# Patient Record
Sex: FEMALE | Race: WHITE | NOT HISPANIC OR LATINO | Employment: FULL TIME | URBAN - METROPOLITAN AREA
[De-identification: names, ages, dates, MRNs, and addresses within clinical notes are randomized per-mention and may not be internally consistent; named-entity substitution may affect disease eponyms.]

---

## 2017-11-04 ENCOUNTER — OFFICE VISIT (OUTPATIENT)
Dept: URGENT CARE | Facility: CLINIC | Age: 47
End: 2017-11-04
Payer: MEDICAID

## 2017-11-04 VITALS
HEART RATE: 92 BPM | HEIGHT: 62 IN | DIASTOLIC BLOOD PRESSURE: 97 MMHG | WEIGHT: 293 LBS | TEMPERATURE: 98 F | BODY MASS INDEX: 53.92 KG/M2 | SYSTOLIC BLOOD PRESSURE: 154 MMHG | RESPIRATION RATE: 18 BRPM

## 2017-11-04 DIAGNOSIS — J01.00 ACUTE NON-RECURRENT MAXILLARY SINUSITIS: Primary | ICD-10-CM

## 2017-11-04 DIAGNOSIS — J45.21 ASTHMA IN ADULT, MILD INTERMITTENT, WITH ACUTE EXACERBATION: ICD-10-CM

## 2017-11-04 DIAGNOSIS — J40 BRONCHITIS: ICD-10-CM

## 2017-11-04 PROCEDURE — 99204 OFFICE O/P NEW MOD 45 MIN: CPT | Mod: S$GLB,,, | Performed by: SURGERY

## 2017-11-04 RX ORDER — METFORMIN HYDROCHLORIDE 500 MG/1
1000 TABLET ORAL 2 TIMES DAILY WITH MEALS
Refills: 3 | COMMUNITY
Start: 2017-10-10

## 2017-11-04 RX ORDER — AZITHROMYCIN 500 MG/1
500 TABLET, FILM COATED ORAL DAILY
Qty: 5 TABLET | Refills: 0 | Status: SHIPPED | OUTPATIENT
Start: 2017-11-04 | End: 2017-11-09

## 2017-11-04 RX ORDER — FLUTICASONE PROPIONATE 50 MCG
2 SPRAY, SUSPENSION (ML) NASAL DAILY
Qty: 1 BOTTLE | Refills: 2 | Status: SHIPPED | OUTPATIENT
Start: 2017-11-04 | End: 2017-12-04

## 2017-11-04 RX ORDER — ALOGLIPTIN 25 MG/1
TABLET, FILM COATED ORAL
Refills: 3 | COMMUNITY
Start: 2017-10-10 | End: 2020-07-06

## 2017-11-04 RX ORDER — FLUOXETINE HYDROCHLORIDE 20 MG/1
CAPSULE ORAL
Refills: 3 | COMMUNITY
Start: 2017-10-05

## 2017-11-04 RX ORDER — ALBUTEROL SULFATE 0.83 MG/ML
2.5 SOLUTION RESPIRATORY (INHALATION) EVERY 6 HOURS PRN
Qty: 1 BOX | Refills: 4 | Status: SHIPPED | OUTPATIENT
Start: 2017-11-04 | End: 2020-07-06

## 2017-11-04 RX ORDER — ALBUTEROL SULFATE 0.83 MG/ML
SOLUTION RESPIRATORY (INHALATION)
Refills: 6 | COMMUNITY
Start: 2017-10-24 | End: 2017-11-04

## 2017-11-04 RX ORDER — PRAVASTATIN SODIUM 40 MG/1
TABLET ORAL
Refills: 3 | COMMUNITY
Start: 2017-10-10 | End: 2020-07-06

## 2017-11-04 RX ORDER — BENZONATATE 200 MG/1
200 CAPSULE ORAL 3 TIMES DAILY PRN
Qty: 21 CAPSULE | Refills: 0 | Status: SHIPPED | OUTPATIENT
Start: 2017-11-04 | End: 2017-11-11

## 2017-11-04 RX ORDER — METHYLPREDNISOLONE 4 MG/1
TABLET ORAL
Qty: 1 PACKAGE | Refills: 0 | Status: SHIPPED | OUTPATIENT
Start: 2017-11-05 | End: 2020-07-06

## 2017-11-04 RX ORDER — DEXAMETHASONE SODIUM PHOSPHATE 100 MG/10ML
10 INJECTION INTRAMUSCULAR; INTRAVENOUS ONCE
Status: COMPLETED | OUTPATIENT
Start: 2017-11-04 | End: 2017-11-04

## 2017-11-04 RX ORDER — AMLODIPINE AND BENAZEPRIL HYDROCHLORIDE 10; 20 MG/1; MG/1
CAPSULE ORAL
Refills: 3 | COMMUNITY
Start: 2017-10-10 | End: 2020-07-06

## 2017-11-04 RX ADMIN — DEXAMETHASONE SODIUM PHOSPHATE 10 MG: 100 INJECTION INTRAMUSCULAR; INTRAVENOUS at 11:11

## 2017-11-04 NOTE — PROGRESS NOTES
"Subjective:       Patient ID: Lita Patel is a 47 y.o. female.    Vitals:  height is 5' 2" (1.575 m) and weight is 154.2 kg (340 lb) (abnormal). Her temperature is 98.3 °F (36.8 °C). Her blood pressure is 154/97 (abnormal) and her pulse is 92. Her respiration is 18.     Chief Complaint: Cough    Cough   This is a recurrent problem. The current episode started in the past 7 days. The problem has been gradually worsening. The problem occurs every few minutes. The cough is productive of purulent sputum. Associated symptoms include chest pain, ear congestion, headaches, nasal congestion, postnasal drip, shortness of breath, sweats and wheezing. Pertinent negatives include no chills, eye redness, fever, myalgias, rash or sore throat. The symptoms are aggravated by dust, fumes and pollens. Risk factors for lung disease include smoking/tobacco exposure. She has tried ipratropium inhaler for the symptoms.     Review of Systems   Constitution: Positive for malaise/fatigue. Negative for chills and fever.   HENT: Positive for congestion and postnasal drip. Negative for hoarse voice and sore throat.    Eyes: Negative for blurred vision, discharge and redness.   Cardiovascular: Positive for chest pain. Negative for dyspnea on exertion and leg swelling.        Pleuritic pain  coughing   Respiratory: Positive for cough, shortness of breath, sputum production and wheezing.    Skin: Negative for rash.   Musculoskeletal: Negative for back pain, joint pain and myalgias.   Gastrointestinal: Negative for abdominal pain, diarrhea, nausea and vomiting.   Neurological: Positive for headaches.   Psychiatric/Behavioral: The patient is not nervous/anxious.        Objective:      Physical Exam   Constitutional: She is oriented to person, place, and time. She appears well-developed and well-nourished. She is cooperative.  Non-toxic appearance. She does not appear ill. No distress.   HENT:   Head: Normocephalic and atraumatic.   Right Ear: " Hearing, tympanic membrane, external ear and ear canal normal.   Left Ear: Hearing, tympanic membrane, external ear and ear canal normal.   Nose: Mucosal edema and rhinorrhea present. No nasal deformity. No epistaxis. Right sinus exhibits maxillary sinus tenderness. Right sinus exhibits no frontal sinus tenderness. Left sinus exhibits maxillary sinus tenderness. Left sinus exhibits no frontal sinus tenderness.   Mouth/Throat: Uvula is midline, oropharynx is clear and moist and mucous membranes are normal. No trismus in the jaw. Normal dentition. No uvula swelling. No posterior oropharyngeal erythema.   Eyes: Conjunctivae and lids are normal. No scleral icterus.   Sclera clear bilat   Neck: Trachea normal, full passive range of motion without pain and phonation normal. Neck supple.   Cardiovascular: Normal rate, regular rhythm, normal heart sounds, intact distal pulses and normal pulses.    Pulmonary/Chest: Effort normal. No respiratory distress. She has wheezes in the right middle field, the right lower field, the left middle field and the left lower field.   Frequent productive cough   Abdominal: Soft. Normal appearance and bowel sounds are normal. She exhibits no distension. There is no tenderness.   Musculoskeletal: Normal range of motion. She exhibits no edema or deformity.   Neurological: She is alert and oriented to person, place, and time. She exhibits normal muscle tone. Coordination normal.   Skin: Skin is warm, dry and intact. She is not diaphoretic. No pallor.   Psychiatric: She has a normal mood and affect. Her speech is normal and behavior is normal. Judgment and thought content normal. Cognition and memory are normal.   Nursing note and vitals reviewed.      Assessment:       1. Acute non-recurrent maxillary sinusitis        Plan:         Acute non-recurrent maxillary sinusitis    Other orders  -     benzonatate (TESSALON) 200 MG capsule; Take 1 capsule (200 mg total) by mouth 3 (three) times daily as  needed for Cough.  Dispense: 21 capsule; Refill: 0  -     dexamethasone injection 10 mg; Inject 1 mL (10 mg total) into the muscle once.  -     methylPREDNISolone (MEDROL DOSEPACK) 4 mg tablet; use as directed  Dispense: 1 Package; Refill: 0  -     albuterol (PROVENTIL) 2.5 mg /3 mL (0.083 %) nebulizer solution; Take 3 mLs (2.5 mg total) by nebulization every 6 (six) hours as needed for Wheezing. Rescue  Dispense: 1 Box; Refill: 4  -     fluticasone (FLONASE) 50 mcg/actuation nasal spray; 2 sprays by Each Nare route once daily.  Dispense: 1 Bottle; Refill: 2  -     azithromycin (ZITHROMAX) 500 MG tablet; Take 1 tablet (500 mg total) by mouth once daily.  Dispense: 5 tablet; Refill: 0        Monitor glucose and decrease or stop medrol dose if over 250. Call for any questions.

## 2017-11-04 NOTE — LETTER
November 4, 2017      [facility] [place]  [address], [address]  [place] [address]  Phone: [phone]  Fax: [phone]       Patient: Lita Patel   YOB: 1970  Date of Visit: 11/04/2017    To Whom It May Concern:    Rachel Patel  was at [facility] on 11/04/2017. She may return to work/school on 11/07/2017with no restrictions. If you have any questions or concerns, or if I can be of further assistance, please do not hesitate to contact me.    Sincerely,    Sandi Gil MA

## 2017-11-04 NOTE — PATIENT INSTRUCTIONS
What Is Acute Bronchitis?  Acute bronchitis is when the airways in your lungs (bronchial tubes) become red and swollen (inflamed). It is usually caused by a viral infection. But it can also occur because of a bacteria or allergen. Symptoms include a cough that produces yellow or greenish mucus and can last for days or sometimes weeks.  Inside healthy lungs    Air travels in and out of the lungs through the airways. The linings of these airways produce sticky mucus. This mucus traps particles that enter the lungs. Tiny structures called cilia then sweep the particles out of the airways.     Healthy airway: Airways are normally open. Air moves in and out easily.      Healthy cilia: Tiny, hairlike cilia sweep mucus and particles up and out of the airways.   Lungs with bronchitis  Bronchitis often occurs with a cold or the flu virus. The airways become inflamed (red and swollen). There is a deep hacking cough from the extra mucus. Other symptoms may include:  · Wheezing  · Chest discomfort  · Shortness of breath  · Mild fever  A second infection, this time due to bacteria, may then occur. And airways irritated by allergens or smoke are more likely to get infected.        Inflamed airway: Inflammation and extra mucus narrow the airway, causing shortness of breath.      Impaired cilia: Extra mucus impairs cilia, causing congestion and wheezing. Smoking makes the problem worse.   Making a diagnosis  A physical exam, health history, and certain tests help your healthcare provider make the diagnosis.  Health history  Your healthcare provider will ask you about your symptoms.  The exam  Your provider listens to your chest for signs of congestion. He or she may also check your ears, nose, and throat.  Possible tests  · A sputum test for bacteria. This requires a sample of mucus from your lungs.  · A nasal or throat swab. This tests to see if you have a bacterial infection.  · A chest X-ray. This is done if your healthcare  provider thinks you have pneumonia.  · Tests to check for an underlying condition. Other tests may be done to check for things such as allergies, asthma, or COPD (chronic obstructive pulmonary disease). You may need to see a specialist for more lung function testing.  Treating a cough  The main treatment for bronchitis is easing symptoms. Avoiding smoke, allergens, and other things that trigger coughing can often help. If the infection is bacterial, you may be given antibiotics. During the illness, it's important to get plenty of sleep. To ease symptoms:  · Dont smoke. Also avoid secondhand smoke.  · Use a humidifier. Or try breathing in steam from a hot shower. This may help loosen mucus.  · Drink a lot of water and juice. They can soothe the throat and may help thin mucus.  · Sit up or use extra pillows when in bed. This helps to lessen coughing and congestion.  · Ask your provider about using medicine. Ask about using cough medicine, pain and fever medicine, or a decongestant.  Antibiotics  Most cases of bronchitis are caused by cold or flu viruses. They dont need antibiotics to treat them, even if your mucus is thick and green or yellow. Antibiotics dont treat viral illness and antibiotics have not been shown to have any benefit in cases of acute bronchitis. Taking antibiotics when they are not needed increases your risk of getting an infection later that is antibiotic-resistant. Antibiotics can also cause severe cases of diarrhea that require other antibiotics to treat.  It is important that you accept your healthcare provider's opinion to not use antibiotics. Your provider will prescribe antibiotics if the infection is caused by bacteria. If they are prescribed:  · Take all of the medicine. Take the medicine until it is used up, even if symptoms have improved. If you dont, the bronchitis may come back.  · Take the medicines as directed. For instance, some medicines should be taken with food.  · Ask about  side effects. Ask your provider or pharmacist what side effects are common, and what to do about them.  Follow-up care  You should see your provider again in 2 to 3 weeks. By this time, symptoms should have improved. An infection that lasts longer may mean you have a more serious problem.  Prevention  · Avoid tobacco smoke. If you smoke, quit. Stay away from smoky places. Ask friends and family not to smoke around you, or in your home or car.  · Get checked for allergies.  · Ask your provider about getting a yearly flu shot. Also ask about pneumococcal or pneumonia shots.  · Wash your hands often. This helps reduce the chance of picking up viruses that cause colds and flu.  Call your healthcare provider if:  · Symptoms worsen, or you have new symptoms  · Breathing problems worsen or  become severe  · Symptoms dont get better within a week, or within 3 days of taking antibiotics   Date Last Reviewed: 2/1/2017  © 5462-7429 [employer]. [address] [place] [address], [place] [address]. All rights reserved. This information is not intended as a substitute for professional medical care. Always follow your healthcare professional's instructions.

## 2017-11-07 ENCOUNTER — TELEPHONE (OUTPATIENT)
Dept: URGENT CARE | Facility: CLINIC | Age: 47
End: 2017-11-07

## 2017-11-07 NOTE — TELEPHONE ENCOUNTER
Callback for DOS 11/04/17-Patient stated still sick.  Told her to continue medicines and follow up with us or PCP if not better.

## 2020-03-25 ENCOUNTER — TELEPHONE (OUTPATIENT)
Dept: CARDIOTHORACIC SURGERY | Facility: CLINIC | Age: 50
End: 2020-03-25

## 2020-03-25 NOTE — TELEPHONE ENCOUNTER
Scheduled pt consult appt with .Instructed pt to obtain [facility] disk from [facility], pt verbalized understanding appt letter mailed.

## 2020-04-09 ENCOUNTER — TELEPHONE (OUTPATIENT)
Dept: CARDIOTHORACIC SURGERY | Facility: CLINIC | Age: 50
End: 2020-04-09

## 2020-04-09 NOTE — TELEPHONE ENCOUNTER
Will mail [facility] [place] disk and we will change appt to vitrual visit next week.    ----- Message from Susy Miranda sent at 4/9/2020  3:08 PM CDT -----  Contact: pt @ [phone]  Calling to speak with someone in Dr. Orozco's office regarding her appt 4/16 with Dr. Orozco. Please call.

## 2020-04-16 ENCOUNTER — OFFICE VISIT (OUTPATIENT)
Dept: CARDIOTHORACIC SURGERY | Facility: CLINIC | Age: 50
End: 2020-04-16
Payer: COMMERCIAL

## 2020-04-16 ENCOUNTER — TELEPHONE (OUTPATIENT)
Dept: CARDIOTHORACIC SURGERY | Facility: CLINIC | Age: 50
End: 2020-04-16

## 2020-04-16 DIAGNOSIS — I35.0 AORTIC VALVE STENOSIS, ETIOLOGY OF CARDIAC VALVE DISEASE UNSPECIFIED: ICD-10-CM

## 2020-04-16 DIAGNOSIS — I48.91 ATRIAL FIBRILLATION, UNSPECIFIED TYPE: ICD-10-CM

## 2020-04-16 DIAGNOSIS — E66.01 SEVERE OBESITY (BMI >= 40): ICD-10-CM

## 2020-04-16 PROCEDURE — 99245 PR OFFICE CONSULTATION,LEVEL V: ICD-10-PCS | Mod: 95,,, | Performed by: THORACIC SURGERY (CARDIOTHORACIC VASCULAR SURGERY)

## 2020-04-16 PROCEDURE — 99245 OFF/OP CONSLTJ NEW/EST HI 55: CPT | Mod: 95,,, | Performed by: THORACIC SURGERY (CARDIOTHORACIC VASCULAR SURGERY)

## 2020-04-16 NOTE — PROGRESS NOTES
The patient location is: home  The chief complaint leading to consultation is: aortic stenosis   Visit type: audiovisual  Total time spent with patient: 35  Each patient to whom he or she provides medical services by telemedicine is:  (1) informed of the relationship between the physician and patient and the respective role of any other health care provider with respect to management of the patient; and (2) notified that he or she may decline to receive medical services by telemedicine and may withdraw from such care at any time.      Subjective:      Patient ID: Lita Patel is a 49 y.o. female.    Chief Complaint: No chief complaint on file.      HPI:  Lita Patel is a 49 y.o. female who presents for surgical evaluation of aortic stenosis. Pt sent by Dr. Erickson. Medical conditions include afib on anticoagulation, asthma, sleep apnea, HTN, DM2, and obesity. Reports that in October she developed SOB and chest pain so went to the ER. Was found to be in afib with RVR and told she has fluid on her lungs. Echo was done with showed moderate to severe aortic stenosis, ANN-MARIE 0.9cm, MG 35.8mmHg, and peak gradient of 73mmHg. States that prior to October she was going to the gym 4 times per week and had lost 35 pounds. Now she is unable to walk to the mailbox without SOB or feeling fatigued. States that she is fatigued all the time. Occasionally gets dizzy when walking. States that at times her heart has erratic beats. Also endorses leg and feet swelling for which she was recently started on fluid medication. Reports that her blood sugar is well controlled, from 60s to 120s. Dr. Paul and Dr. Erickson discussed whether TAVR is better option as patient has severe obesity. BMI 62.2. No prior surgeries to chest, blood clots, stents.       Family and social history reviewed    Review of patient's allergies indicates:   Allergen Reactions    Avelox [moxifloxacin]     Penicillins      No past medical history on file.  No past  surgical history on file.  Family History     None        Social History     Socioeconomic History    Marital status:      Spouse name: Not on file    Number of children: Not on file    Years of education: Not on file    Highest education level: Not on file   Occupational History    Not on file   Social Needs    Financial resource strain: Not on file    Food insecurity:     Worry: Not on file     Inability: Not on file    Transportation needs:     Medical: Not on file     Non-medical: Not on file   Tobacco Use    Smoking status: Former Smoker    Smokeless tobacco: Never Used   Substance and Sexual Activity    Alcohol use: No    Drug use: Not on file    Sexual activity: Yes   Lifestyle    Physical activity:     Days per week: Not on file     Minutes per session: Not on file    Stress: Not on file   Relationships    Social connections:     Talks on phone: Not on file     Gets together: Not on file     Attends Jew service: Not on file     Active member of club or organization: Not on file     Attends meetings of clubs or organizations: Not on file     Relationship status: Not on file   Other Topics Concern    Not on file   Social History Narrative    Not on file       Current medications Reviewed    Review of Systems   Constitutional: Positive for activity change and fatigue.   HENT: Negative for nosebleeds.    Eyes: Negative for visual disturbance.   Respiratory: Positive for shortness of breath.    Cardiovascular: Positive for chest pain, palpitations and leg swelling.   Gastrointestinal: Negative for nausea.   Musculoskeletal: Negative for gait problem.   Skin: Negative for color change.   Neurological: Positive for dizziness and weakness.   Hematological: Does not bruise/bleed easily.   Psychiatric/Behavioral: Negative for sleep disturbance.     Objective:   Diagnostic Results:   TTE 2/26/2020  - LV systolic function normal   - EF 60%  - LV hypertrophy moderate   - AV sclerosis with  moderate to severe AS. ANN-MARIE 0.9cm. Peak velocity 4.3m/s. Mean velocity 2.7m/s. Peak gradient 73mmHg. Mean gradient 35.8mmHg.   - LVOT diameter 2.0cms  - Aortic root diameter remains unchanged at 3.2cm     Outside [facility] with non obstructive CAD   Assessment:   Aortic Stenosis   Afib   Plan:   Due to comorbidities and obesity, likely plan for TAVR.     CTS Attending Note:    I agree with the DERECK's note as stated above.  She is at least moderate risk for surgical aortic valve replacement given her comorbid conditions, in particular her body habitus.  Recommend ANGELA.

## 2020-04-16 NOTE — LETTER
[facility]  [address]  [place] [address]  Phone: [phone] April 20, 2020      Seth Erickson MD  [address] [address]    Patient: Lita Patel   MR Number: [id number]   YOB: 1970   Date of Visit: 4/16/2020     Dear Dr. Erickson,     I had the pleasure of seeing your patient Ms. Mamta Patel in clinic today.  As you know, she is a very pleasant 49-year-old woman who initially presented with chest pain and dyspnea on exertion.  She underwent a thoughtful and thorough evaluation and was found to have severe aortic stenosis.      I had a telemedicine visit with her, and discussed her situation with her in detail.  Given her comorbid conditions, I believe she is at least at moderate risk for surgical aortic valve replacement.  I think a transcatheter valve would be the best option for her.  She agreed with this.  Thank you for sending this pleasant woman to me.  If I can be of assistance in the future, please do not hesitate to let me know.  Thank you again for sending her to me.    Sincerely,    Osmel Orozco MD  Chief, Division of Thoracic & Cardiovascular Surgery  [facility]    PEP/afw

## 2020-07-14 PROBLEM — I50.32 CHRONIC DIASTOLIC HEART FAILURE, NYHA CLASS 3: Status: ACTIVE | Noted: 2020-07-14

## 2020-09-22 ENCOUNTER — OFFICE VISIT (OUTPATIENT)
Dept: URGENT CARE | Facility: CLINIC | Age: 50
End: 2020-09-22
Payer: COMMERCIAL

## 2020-09-22 VITALS
WEIGHT: 293 LBS | RESPIRATION RATE: 16 BRPM | SYSTOLIC BLOOD PRESSURE: 156 MMHG | HEART RATE: 65 BPM | HEIGHT: 62 IN | DIASTOLIC BLOOD PRESSURE: 91 MMHG | OXYGEN SATURATION: 99 % | TEMPERATURE: 98 F | BODY MASS INDEX: 53.92 KG/M2

## 2020-09-22 DIAGNOSIS — R51.9 ACUTE NONINTRACTABLE HEADACHE, UNSPECIFIED HEADACHE TYPE: Primary | ICD-10-CM

## 2020-09-22 DIAGNOSIS — E66.01 SEVERE OBESITY (BMI >= 40): ICD-10-CM

## 2020-09-22 DIAGNOSIS — G47.33 OBSTRUCTIVE SLEEP APNEA SYNDROME: ICD-10-CM

## 2020-09-22 DIAGNOSIS — Z20.822 EXPOSURE TO COVID-19 VIRUS: ICD-10-CM

## 2020-09-22 DIAGNOSIS — Z95.2 S/P AVR (AORTIC VALVE REPLACEMENT) AND AORTOPLASTY: ICD-10-CM

## 2020-09-22 DIAGNOSIS — I35.0 AORTIC VALVE STENOSIS, ETIOLOGY OF CARDIAC VALVE DISEASE UNSPECIFIED: ICD-10-CM

## 2020-09-22 DIAGNOSIS — I50.32 CHRONIC DIASTOLIC HEART FAILURE, NYHA CLASS 3: ICD-10-CM

## 2020-09-22 LAB
CTP QC/QA: YES
SARS-COV-2 RDRP RESP QL NAA+PROBE: NEGATIVE

## 2020-09-22 PROCEDURE — U0002: ICD-10-PCS | Mod: S$GLB,,, | Performed by: INTERNAL MEDICINE

## 2020-09-22 PROCEDURE — U0002 COVID-19 LAB TEST NON-CDC: HCPCS | Mod: S$GLB,,, | Performed by: INTERNAL MEDICINE

## 2020-09-22 PROCEDURE — 99204 OFFICE O/P NEW MOD 45 MIN: CPT | Mod: S$GLB,,, | Performed by: INTERNAL MEDICINE

## 2020-09-22 PROCEDURE — 99204 PR OFFICE/OUTPT VISIT, NEW, LEVL IV, 45-59 MIN: ICD-10-PCS | Mod: S$GLB,,, | Performed by: INTERNAL MEDICINE

## 2020-09-22 RX ORDER — MONTELUKAST SODIUM 10 MG/1
10 TABLET ORAL NIGHTLY
COMMUNITY

## 2020-09-22 NOTE — PROGRESS NOTES
"Subjective:       Patient ID: Lita Patel is a 49 y.o. female.    Vitals:  height is 5' 2" (1.575 m) and weight is 157.9 kg (348 lb) (abnormal). Her tympanic temperature is 98.4 °F (36.9 °C). Her blood pressure is 156/91 (abnormal) and her pulse is 65. Her respiration is 16 and oxygen saturation is 99%.     Chief Complaint: COVID-19 Concerns    Patient states she attended a conference this weekend and was exposed to several COVID positive people.        Constitution: Positive for fatigue. Negative for chills, sweating and fever.   HENT: Positive for congestion, sinus pain, sinus pressure and sore throat. Negative for ear pain and voice change.    Neck: Negative for painful lymph nodes.   Eyes: Negative for eye redness.   Respiratory: Positive for cough, shortness of breath and asthma. Negative for chest tightness, sputum production, bloody sputum, COPD, stridor and wheezing.    Gastrointestinal: Negative for nausea and vomiting.   Musculoskeletal: Negative for muscle ache.   Skin: Negative for rash.   Allergic/Immunologic: Positive for asthma. Negative for seasonal allergies.   Hematologic/Lymphatic: Negative for swollen lymph nodes.       Objective:      Physical Exam   Constitutional: She is oriented to person, place, and time. She appears well-developed. She is cooperative.  Non-toxic appearance. She does not appear ill. No distress.   HENT:   Head: Normocephalic and atraumatic.   Ears:   Right Ear: Hearing, tympanic membrane, external ear and ear canal normal. Tympanic membrane is not injected and not erythematous.   Left Ear: Hearing, tympanic membrane, external ear and ear canal normal. Tympanic membrane is not injected and not erythematous.   Nose: Nose normal. No mucosal edema, rhinorrhea or nasal deformity. No epistaxis. Right sinus exhibits no maxillary sinus tenderness and no frontal sinus tenderness. Left sinus exhibits no maxillary sinus tenderness and no frontal sinus tenderness.   Mouth/Throat: " Uvula is midline, oropharynx is clear and moist and mucous membranes are normal. No trismus in the jaw. Normal dentition. No uvula swelling. No oropharyngeal exudate, posterior oropharyngeal edema or posterior oropharyngeal erythema.   Eyes: Conjunctivae and lids are normal. No scleral icterus.   Neck: Trachea normal, full passive range of motion without pain and phonation normal. Neck supple. No neck rigidity. No edema and no erythema present.   Cardiovascular: Normal rate, regular rhythm, S1 normal, S2 normal, normal heart sounds and normal pulses.   No murmur heard.  Pulmonary/Chest: Effort normal and breath sounds normal. No accessory muscle usage or stridor. No respiratory distress. She has no decreased breath sounds. She has no wheezes. She has no rhonchi. She has no rales.   Abdominal: Normal appearance.   Musculoskeletal: Normal range of motion.         General: No deformity.   Neurological: She is alert and oriented to person, place, and time. She exhibits normal muscle tone. Coordination normal.   Skin: Skin is warm, dry, intact, not diaphoretic and not pale. Capillary refill takes less than 2 seconds. Psychiatric: Her speech is normal and behavior is normal. Judgment and thought content normal.   Nursing note and vitals reviewed.        Assessment:       1. Acute nonintractable headache, unspecified headache type    2. Exposure to Covid-19 Virus    3. Severe obesity (BMI >= 40)    4. Aortic valve stenosis, etiology of cardiac valve disease unspecified    5. Chronic diastolic heart failure, NYHA class 3    6. S/P AVR (aortic valve replacement) and aortoplasty    7. Obstructive sleep apnea syndrome        Plan:         Acute nonintractable headache, unspecified headache type  -     POCT COVID-19 Rapid Screening    Exposure to Covid-19 Virus  -     POCT COVID-19 Rapid Screening    Severe obesity (BMI >= 40)    Aortic valve stenosis, etiology of cardiac valve disease unspecified    Chronic diastolic heart  failure, NYHA class 3    S/P AVR (aortic valve replacement) and aortoplasty    Obstructive sleep apnea syndrome        take vitamins

## 2020-12-24 ENCOUNTER — CLINICAL SUPPORT (OUTPATIENT)
Dept: URGENT CARE | Facility: CLINIC | Age: 50
End: 2020-12-24
Payer: COMMERCIAL

## 2020-12-24 DIAGNOSIS — Z11.59 SCREENING FOR VIRAL DISEASE: Primary | ICD-10-CM

## 2020-12-24 LAB
CTP QC/QA: YES
SARS-COV-2 RDRP RESP QL NAA+PROBE: NEGATIVE

## 2020-12-24 PROCEDURE — U0002: ICD-10-PCS | Mod: QW,S$GLB,, | Performed by: NURSE PRACTITIONER

## 2020-12-24 PROCEDURE — U0002 COVID-19 LAB TEST NON-CDC: HCPCS | Mod: QW,S$GLB,, | Performed by: NURSE PRACTITIONER

## 2020-12-24 NOTE — PATIENT INSTRUCTIONS
Your test was NEGATIVE for COVID-19 (coronavirus).      You may leave home and/or return to work when the following conditions are met:  · 24 hours fever free without fever-reducing medications AND  · Improved symptoms  · You have not met the conditions of a close contact     What counts as a close contact?  · You were within 6 feet of someone who has COVID-19 for a total of 15 minutes or more (masked or unmasked).  · You provided care at home to someone who is sick with COVID-19.  · You had direct physical contact with the person (hugged or kissed them).  · You shared eating or drinking utensils.  · They sneezed, coughed, or somehow got respiratory droplets on you.     If you had a close contact:  · If possible, it is recommended that you quarantine for 14 days from the time of contact regardless of your test status.  · If you have no symptoms, quarantine may be stopped early at 10 days, but this carries a small risk of spreading the virus.  · If you have no symptoms and you have a negative COVID test on day 5 or later, quarantine may be stopped after day 7, but this also carries a small risk of spreading the virus.     Additional instructions:  · Social distance per your local guidelines  · Call ahead before visiting your doctor.  · Wear a mask when around others who do not live in your household.  · Cover your coughs and sneezes.  · Wash hands or use hand  often.      If your symptoms worsen or if you have any other concerns, please contact [facility] at [phone].     Sincerely,    Alayna Craig, RT

## 2021-01-11 ENCOUNTER — CLINICAL SUPPORT (OUTPATIENT)
Dept: URGENT CARE | Facility: CLINIC | Age: 51
End: 2021-01-11
Payer: COMMERCIAL

## 2021-01-11 DIAGNOSIS — Z11.59 SCREENING FOR VIRAL DISEASE: Primary | ICD-10-CM

## 2021-01-11 LAB
CTP QC/QA: YES
SARS-COV-2 RDRP RESP QL NAA+PROBE: NEGATIVE

## 2021-01-11 PROCEDURE — U0002: ICD-10-PCS | Mod: QW,S$GLB,, | Performed by: NURSE PRACTITIONER

## 2021-01-11 PROCEDURE — U0002 COVID-19 LAB TEST NON-CDC: HCPCS | Mod: QW,S$GLB,, | Performed by: NURSE PRACTITIONER

## 2021-02-19 ENCOUNTER — OFFICE VISIT (OUTPATIENT)
Dept: URGENT CARE | Facility: CLINIC | Age: 51
End: 2021-02-19
Payer: COMMERCIAL

## 2021-02-19 VITALS
TEMPERATURE: 99 F | HEART RATE: 59 BPM | WEIGHT: 293 LBS | HEIGHT: 62 IN | DIASTOLIC BLOOD PRESSURE: 79 MMHG | BODY MASS INDEX: 53.92 KG/M2 | OXYGEN SATURATION: 99 % | SYSTOLIC BLOOD PRESSURE: 158 MMHG | RESPIRATION RATE: 16 BRPM

## 2021-02-19 DIAGNOSIS — J20.9 ACUTE BRONCHITIS, UNSPECIFIED ORGANISM: ICD-10-CM

## 2021-02-19 DIAGNOSIS — R05.9 COUGH: Primary | ICD-10-CM

## 2021-02-19 DIAGNOSIS — J01.40 ACUTE PANSINUSITIS, RECURRENCE NOT SPECIFIED: ICD-10-CM

## 2021-02-19 LAB
CTP QC/QA: YES
SARS-COV-2 RDRP RESP QL NAA+PROBE: NEGATIVE

## 2021-02-19 PROCEDURE — 3008F PR BODY MASS INDEX (BMI) DOCUMENTED: ICD-10-PCS | Mod: CPTII,S$GLB,, | Performed by: PHYSICIAN ASSISTANT

## 2021-02-19 PROCEDURE — U0002: ICD-10-PCS | Mod: QW,S$GLB,, | Performed by: PHYSICIAN ASSISTANT

## 2021-02-19 PROCEDURE — U0002 COVID-19 LAB TEST NON-CDC: HCPCS | Mod: QW,S$GLB,, | Performed by: PHYSICIAN ASSISTANT

## 2021-02-19 PROCEDURE — 99214 PR OFFICE/OUTPT VISIT, EST, LEVL IV, 30-39 MIN: ICD-10-PCS | Mod: S$GLB,,, | Performed by: PHYSICIAN ASSISTANT

## 2021-02-19 PROCEDURE — 99214 OFFICE O/P EST MOD 30 MIN: CPT | Mod: S$GLB,,, | Performed by: PHYSICIAN ASSISTANT

## 2021-02-19 PROCEDURE — 3008F BODY MASS INDEX DOCD: CPT | Mod: CPTII,S$GLB,, | Performed by: PHYSICIAN ASSISTANT

## 2021-02-19 RX ORDER — DOXYCYCLINE HYCLATE 100 MG
100 TABLET ORAL 2 TIMES DAILY
Qty: 20 TABLET | Refills: 0 | Status: SHIPPED | OUTPATIENT
Start: 2021-02-19 | End: 2021-03-01

## 2021-02-19 RX ORDER — MONTELUKAST SODIUM 10 MG/1
10 TABLET ORAL NIGHTLY
Qty: 30 TABLET | Refills: 0 | Status: SHIPPED | OUTPATIENT
Start: 2021-02-19 | End: 2021-03-21

## 2021-02-19 RX ORDER — FLUTICASONE PROPIONATE 50 MCG
1 SPRAY, SUSPENSION (ML) NASAL 2 TIMES DAILY PRN
Qty: 15 G | Refills: 0 | Status: SHIPPED | OUTPATIENT
Start: 2021-02-19

## 2021-02-19 RX ORDER — GUAIFENESIN 600 MG/1
1200 TABLET, EXTENDED RELEASE ORAL 2 TIMES DAILY
Qty: 40 TABLET | Refills: 0 | Status: SHIPPED | OUTPATIENT
Start: 2021-02-19 | End: 2021-03-01

## 2021-02-19 RX ORDER — BENZONATATE 200 MG/1
200 CAPSULE ORAL 3 TIMES DAILY PRN
Qty: 30 CAPSULE | Refills: 0 | Status: SHIPPED | OUTPATIENT
Start: 2021-02-19 | End: 2021-03-01

## 2021-05-04 ENCOUNTER — PATIENT MESSAGE (OUTPATIENT)
Dept: RESEARCH | Facility: HOSPITAL | Age: 51
End: 2021-05-04

## 2021-07-26 ENCOUNTER — OFFICE VISIT (OUTPATIENT)
Dept: URGENT CARE | Facility: CLINIC | Age: 51
End: 2021-07-26

## 2021-07-26 VITALS
BODY MASS INDEX: 53.92 KG/M2 | OXYGEN SATURATION: 97 % | TEMPERATURE: 98 F | HEART RATE: 69 BPM | SYSTOLIC BLOOD PRESSURE: 159 MMHG | DIASTOLIC BLOOD PRESSURE: 98 MMHG | RESPIRATION RATE: 16 BRPM | WEIGHT: 293 LBS | HEIGHT: 62 IN

## 2021-07-26 DIAGNOSIS — J45.909 ASTHMA, UNSPECIFIED ASTHMA SEVERITY, UNSPECIFIED WHETHER COMPLICATED, UNSPECIFIED WHETHER PERSISTENT: ICD-10-CM

## 2021-07-26 DIAGNOSIS — R05.9 COUGH: ICD-10-CM

## 2021-07-26 DIAGNOSIS — Z20.822 CLOSE EXPOSURE TO COVID-19 VIRUS: ICD-10-CM

## 2021-07-26 DIAGNOSIS — U07.1 COVID-19 VIRUS DETECTED: Primary | ICD-10-CM

## 2021-07-26 LAB
CTP QC/QA: YES
SARS-COV-2 RDRP RESP QL NAA+PROBE: POSITIVE

## 2021-07-26 PROCEDURE — U0002 COVID-19 LAB TEST NON-CDC: HCPCS | Mod: QW,CR,S$GLB, | Performed by: PHYSICIAN ASSISTANT

## 2021-07-26 PROCEDURE — 99214 PR OFFICE/OUTPT VISIT, EST, LEVL IV, 30-39 MIN: ICD-10-PCS | Mod: S$GLB,CS,, | Performed by: PHYSICIAN ASSISTANT

## 2021-07-26 PROCEDURE — 1160F PR REVIEW ALL MEDS BY PRESCRIBER/CLIN PHARMACIST DOCUMENTED: ICD-10-PCS | Mod: CPTII,S$GLB,, | Performed by: PHYSICIAN ASSISTANT

## 2021-07-26 PROCEDURE — 99214 OFFICE O/P EST MOD 30 MIN: CPT | Mod: S$GLB,CS,, | Performed by: PHYSICIAN ASSISTANT

## 2021-07-26 PROCEDURE — 3008F BODY MASS INDEX DOCD: CPT | Mod: CPTII,S$GLB,, | Performed by: PHYSICIAN ASSISTANT

## 2021-07-26 PROCEDURE — 1159F MED LIST DOCD IN RCRD: CPT | Mod: CPTII,S$GLB,, | Performed by: PHYSICIAN ASSISTANT

## 2021-07-26 PROCEDURE — 3008F PR BODY MASS INDEX (BMI) DOCUMENTED: ICD-10-PCS | Mod: CPTII,S$GLB,, | Performed by: PHYSICIAN ASSISTANT

## 2021-07-26 PROCEDURE — 1159F PR MEDICATION LIST DOCUMENTED IN MEDICAL RECORD: ICD-10-PCS | Mod: CPTII,S$GLB,, | Performed by: PHYSICIAN ASSISTANT

## 2021-07-26 PROCEDURE — 1160F RVW MEDS BY RX/DR IN RCRD: CPT | Mod: CPTII,S$GLB,, | Performed by: PHYSICIAN ASSISTANT

## 2021-07-26 PROCEDURE — U0002: ICD-10-PCS | Mod: QW,CR,S$GLB, | Performed by: PHYSICIAN ASSISTANT

## 2021-07-26 RX ORDER — PREDNISONE 20 MG/1
20 TABLET ORAL DAILY
Qty: 5 TABLET | Refills: 0 | Status: SHIPPED | OUTPATIENT
Start: 2021-07-26 | End: 2021-07-31

## 2021-07-26 RX ORDER — PROMETHAZINE HYDROCHLORIDE AND DEXTROMETHORPHAN HYDROBROMIDE 6.25; 15 MG/5ML; MG/5ML
5 SYRUP ORAL NIGHTLY PRN
Qty: 120 ML | Refills: 0 | Status: SHIPPED | OUTPATIENT
Start: 2021-07-26 | End: 2021-07-29

## 2021-07-26 RX ORDER — ALBUTEROL SULFATE 90 UG/1
2 AEROSOL, METERED RESPIRATORY (INHALATION) EVERY 6 HOURS PRN
Qty: 18 G | Refills: 0 | Status: SHIPPED | OUTPATIENT
Start: 2021-07-26 | End: 2022-07-26

## 2021-07-27 ENCOUNTER — NURSE TRIAGE (OUTPATIENT)
Dept: ADMINISTRATIVE | Facility: CLINIC | Age: 51
End: 2021-07-27

## 2021-07-28 ENCOUNTER — NURSE TRIAGE (OUTPATIENT)
Dept: ADMINISTRATIVE | Facility: CLINIC | Age: 51
End: 2021-07-28

## 2021-07-29 ENCOUNTER — INFUSION (OUTPATIENT)
Dept: INFECTIOUS DISEASES | Facility: HOSPITAL | Age: 51
End: 2021-07-29
Attending: FAMILY MEDICINE
Payer: COMMERCIAL

## 2021-07-29 VITALS
DIASTOLIC BLOOD PRESSURE: 64 MMHG | TEMPERATURE: 97 F | RESPIRATION RATE: 18 BRPM | OXYGEN SATURATION: 96 % | SYSTOLIC BLOOD PRESSURE: 128 MMHG | HEART RATE: 96 BPM

## 2021-07-29 DIAGNOSIS — U07.1 COVID-19: Primary | ICD-10-CM

## 2021-07-29 PROCEDURE — M0243 CASIRIVI AND IMDEVI INFUSION: HCPCS | Performed by: FAMILY MEDICINE

## 2021-07-29 PROCEDURE — 63600175 PHARM REV CODE 636 W HCPCS: Performed by: FAMILY MEDICINE

## 2021-07-29 PROCEDURE — 25000003 PHARM REV CODE 250: Performed by: FAMILY MEDICINE

## 2021-07-29 RX ORDER — DIPHENHYDRAMINE HYDROCHLORIDE 50 MG/ML
25 INJECTION INTRAMUSCULAR; INTRAVENOUS ONCE AS NEEDED
Status: ACTIVE | OUTPATIENT
Start: 2021-07-29 | End: 2032-12-24

## 2021-07-29 RX ORDER — ACETAMINOPHEN 325 MG/1
650 TABLET ORAL ONCE AS NEEDED
Status: ACTIVE | OUTPATIENT
Start: 2021-07-29 | End: 2032-12-24

## 2021-07-29 RX ORDER — EPINEPHRINE 0.3 MG/.3ML
0.3 INJECTION SUBCUTANEOUS
Status: ACTIVE | OUTPATIENT
Start: 2021-07-29

## 2021-07-29 RX ORDER — SODIUM CHLORIDE 0.9 % (FLUSH) 0.9 %
10 SYRINGE (ML) INJECTION
Status: ACTIVE | OUTPATIENT
Start: 2021-07-29

## 2021-07-29 RX ORDER — ONDANSETRON 4 MG/1
4 TABLET, ORALLY DISINTEGRATING ORAL ONCE AS NEEDED
Status: ACTIVE | OUTPATIENT
Start: 2021-07-29 | End: 2032-12-24

## 2021-07-29 RX ADMIN — CASIRIVIMAB AND IMDEVIMAB 600 MG: 600; 600 INJECTION, SOLUTION, CONCENTRATE INTRAVENOUS at 08:07

## 2024-05-06 ENCOUNTER — OFFICE VISIT (OUTPATIENT)
Dept: URGENT CARE | Facility: CLINIC | Age: 54
End: 2024-05-06
Payer: MEDICAID

## 2024-05-06 VITALS
BODY MASS INDEX: 52.96 KG/M2 | SYSTOLIC BLOOD PRESSURE: 137 MMHG | TEMPERATURE: 98 F | HEART RATE: 72 BPM | DIASTOLIC BLOOD PRESSURE: 82 MMHG | HEIGHT: 62 IN | OXYGEN SATURATION: 95 % | WEIGHT: 287.81 LBS | RESPIRATION RATE: 20 BRPM

## 2024-05-06 DIAGNOSIS — J45.901 EXACERBATION OF ASTHMA, UNSPECIFIED ASTHMA SEVERITY, UNSPECIFIED WHETHER PERSISTENT: ICD-10-CM

## 2024-05-06 DIAGNOSIS — R05.9 COUGH, UNSPECIFIED TYPE: ICD-10-CM

## 2024-05-06 DIAGNOSIS — J40 BRONCHITIS: ICD-10-CM

## 2024-05-06 DIAGNOSIS — B96.89 ACUTE BACTERIAL SINUSITIS: Primary | ICD-10-CM

## 2024-05-06 DIAGNOSIS — J01.90 ACUTE BACTERIAL SINUSITIS: Primary | ICD-10-CM

## 2024-05-06 LAB
CTP QC/QA: YES
POC MOLECULAR INFLUENZA A AGN: NEGATIVE
POC MOLECULAR INFLUENZA B AGN: NEGATIVE
SARS-COV-2 AG RESP QL IA.RAPID: NEGATIVE
SARS-COV-2 RDRP RESP QL NAA+PROBE: NEGATIVE

## 2024-05-06 PROCEDURE — 87811 SARS-COV-2 COVID19 W/OPTIC: CPT | Mod: QW,S$GLB,, | Performed by: NURSE PRACTITIONER

## 2024-05-06 PROCEDURE — 99214 OFFICE O/P EST MOD 30 MIN: CPT | Mod: S$GLB,,, | Performed by: NURSE PRACTITIONER

## 2024-05-06 PROCEDURE — 87502 INFLUENZA DNA AMP PROBE: CPT | Mod: QW,S$GLB,, | Performed by: NURSE PRACTITIONER

## 2024-05-06 RX ORDER — DOXYCYCLINE 100 MG/1
100 CAPSULE ORAL EVERY 12 HOURS
Qty: 14 CAPSULE | Refills: 0 | Status: SHIPPED | OUTPATIENT
Start: 2024-05-06 | End: 2024-05-13

## 2024-05-06 RX ORDER — SEMAGLUTIDE 1.34 MG/ML
1 INJECTION, SOLUTION SUBCUTANEOUS
COMMUNITY
Start: 2024-04-15

## 2024-05-06 RX ORDER — ROSUVASTATIN CALCIUM 20 MG/1
20 TABLET, COATED ORAL NIGHTLY
COMMUNITY
Start: 2024-03-23

## 2024-05-06 RX ORDER — LEVALBUTEROL INHALATION SOLUTION 1.25 MG/3ML
1 SOLUTION RESPIRATORY (INHALATION) EVERY 4 HOURS PRN
Qty: 75 ML | Refills: 0 | Status: SHIPPED | OUTPATIENT
Start: 2024-05-06 | End: 2025-05-06

## 2024-05-06 RX ORDER — AZELASTINE 1 MG/ML
1 SPRAY, METERED NASAL 2 TIMES DAILY
Qty: 30 ML | Refills: 0 | Status: SHIPPED | OUTPATIENT
Start: 2024-05-06 | End: 2025-05-06

## 2024-05-06 RX ORDER — VALSARTAN 80 MG/1
80 TABLET ORAL
COMMUNITY
Start: 2024-03-24

## 2024-05-06 NOTE — PROGRESS NOTES
"Subjective:      Patient ID: Lita Patel is a 53 y.o. female.    Vitals:  height is 5' 2" (1.575 m) and weight is 130.6 kg (287 lb 13 oz). Her oral temperature is 98.4 °F (36.9 °C). Her blood pressure is 137/82 and her pulse is 72. Her respiration is 20 and oxygen saturation is 95%.     Chief Complaint: Cough    Pt states that she is coming in for a cough and congestion. Started with sinus congestion x few weeks. +sinus pressure/pain. Started with cough and wheezing yesterday.  Would like Xopenex refill    Cough  This is a new problem. The current episode started yesterday. The problem has been gradually worsening. The problem occurs every few minutes. Cough characteristics: mucus. Associated symptoms include headaches, nasal congestion, a sore throat and wheezing. Pertinent negatives include no fever or shortness of breath. The symptoms are aggravated by lying down. Treatments tried: inhaler. The treatment provided mild relief. Her past medical history is significant for asthma.       Constitution: Negative for fever.   HENT:  Positive for sore throat.    Respiratory:  Positive for cough and wheezing. Negative for shortness of breath.    Neurological:  Positive for headaches.      Objective:     Physical Exam   Constitutional: She is oriented to person, place, and time. She appears well-developed. She is cooperative.  Non-toxic appearance. She appears ill. No distress.   HENT:   Head: Normocephalic and atraumatic.   Ears:   Right Ear: External ear and ear canal normal. A middle ear effusion is present.   Left Ear: External ear and ear canal normal. A middle ear effusion is present.   Nose: Mucosal edema, rhinorrhea and purulent discharge present. No nasal deformity. No epistaxis. Right sinus exhibits maxillary sinus tenderness. Right sinus exhibits no frontal sinus tenderness. Left sinus exhibits maxillary sinus tenderness. Left sinus exhibits no frontal sinus tenderness.   Mouth/Throat: Uvula is midline and " mucous membranes are normal. No trismus in the jaw. Normal dentition. No uvula swelling. Posterior oropharyngeal erythema present. No oropharyngeal exudate or posterior oropharyngeal edema.   Eyes: Conjunctivae and lids are normal. No scleral icterus.   Neck: Trachea normal and phonation normal. Neck supple. No edema present. No erythema present. No neck rigidity present.   Cardiovascular: Normal rate, regular rhythm, normal heart sounds and normal pulses.   Pulmonary/Chest: Effort normal. No accessory muscle usage. No tachypnea. No respiratory distress. She has decreased breath sounds. She has no wheezes. She has no rhonchi. She has no rales.   Abdominal: Normal appearance.   Musculoskeletal: Normal range of motion.         General: No deformity. Normal range of motion.   Neurological: She is alert and oriented to person, place, and time. She exhibits normal muscle tone. Coordination normal.   Skin: Skin is warm, dry, intact, not diaphoretic and not pale.   Psychiatric: Her speech is normal and behavior is normal. Judgment and thought content normal.   Nursing note and vitals reviewed.      Assessment:     1. Acute bacterial sinusitis    2. Cough, unspecified type    3. Bronchitis    4. Exacerbation of asthma, unspecified asthma severity, unspecified whether persistent        Plan:     Results for orders placed or performed in visit on 05/06/24   POCT Influenza A/B MOLECULAR   Result Value Ref Range    POC Molecular Influenza A Ag Negative Negative    POC Molecular Influenza B Ag Negative Negative     Acceptable Yes    SARS Coronavirus 2 Antigen, POCT Manual Read   Result Value Ref Range    SARS Coronavirus 2 Antigen Negative Negative     Acceptable Yes    POCT COVID-19 Rapid Screening   Result Value Ref Range    POC Rapid COVID Negative Negative     Acceptable Yes         Acute bacterial sinusitis  -     doxycycline (VIBRAMYCIN) 100 MG Cap; Take 1 capsule (100 mg total)  by mouth every 12 (twelve) hours. for 7 days  Dispense: 14 capsule; Refill: 0  -     azelastine (ASTELIN) 137 mcg (0.1 %) nasal spray; 1 spray (137 mcg total) by Nasal route 2 (two) times daily.  Dispense: 30 mL; Refill: 0    Cough, unspecified type  -     POCT Influenza A/B MOLECULAR  -     SARS Coronavirus 2 Antigen, POCT Manual Read  -     POCT COVID-19 Rapid Screening    Bronchitis  -     levalbuterol (XOPENEX) 1.25 mg/3 mL nebulizer solution; Take 3 mLs (1.25 mg total) by nebulization every 4 (four) hours as needed for Wheezing or Shortness of Breath. Rescue  Dispense: 75 mL; Refill: 0    Exacerbation of asthma, unspecified asthma severity, unspecified whether persistent  -     levalbuterol (XOPENEX) 1.25 mg/3 mL nebulizer solution; Take 3 mLs (1.25 mg total) by nebulization every 4 (four) hours as needed for Wheezing or Shortness of Breath. Rescue  Dispense: 75 mL; Refill: 0

## 2024-05-06 NOTE — PATIENT INSTRUCTIONS
ACUTE SINUSITIS  Acute sinusitis is an inflammation of the mucous membranes inside the nose and sinuses and lasts for less than 4 weeks. Acute sinusitis is common and often follows a cold.     Acute sinusitis causes thick, discolored mucus that drains from the nose or down the back of the throat. It also can cause pain and pressure in your head and face along with a stuffy or blocked nose. In most cases, sinusitis gets better on its own in 1 to 2 weeks. The primary treatment for sinusitis involves symptom relief.      Not all colored drainage means that there is a bacterial infection present. According to the Center for Disease Control, only 2% of colds will progress to result in bacterial sinusitis requiring antibiotics.    If the doctor prescribed antibiotics, take them as directed. Do not stop taking them just because you feel better. You need to take the full course of antibiotics.  ACUTE SINUSITIS TREATMENT     The following are symptomatic treatments to relieve symptoms of discomfort and congestion. These treatments do not shorten the duration of illness.    Pain relief   Nonprescription pain medications, such as acetaminophen (Tylenol) or ibuprofen (Motrin, Advil), are recommended for pain.    Nasal saline   Nasal saline is available over the counter. There are several different commercial preparations such as Ocean spray and [place] spray. There is no limit on the use of Nasal saline. Saline is used by snorting the mist up into the nose then later gently blowing the nose to get rid of any secretions that it has loosened.    Nasal irrigation  Flushing the nose and sinuses with a saline solution several times per day can decrease pain associated with congestion and shorten the duration of symptoms.     Nasal steroids   Nasal steroids, such as Flonase, can be beneficial and help reduce swelling inside the nose. These drugs have few side effects and relieve symptoms in most people. Unfortunately, they do not begin  to work for 2-3 days and do not reach their maximum benefit for approximately 2-3 weeks.   There are several nasal steroids available by prescription as well as a few that can be purchased without a prescription (over the counter). These drugs are all effective but differ in how frequently they must be used and how much they cost.    Nasal anticholinergics  Ipratropium bromide (nasal spray) is available by prescription and can be very effective in decreasing the symptom of runny nose and other related symptoms (eg, post-nasal drainage, sore throat). These sprays, like all medications, can interact with other medications, so it is important that your complete medication list be reviewed by your physician before you take this medication.    Oral decongestants  Oral decongestants (most commonly pseudoephedrine) may be helpful if you have associated symptoms of ear pain or fullness.    Nasal decongestant sprays   Nasal decongestant sprays, including oxymetazoline (Afrin) and phenylephrine (Collin-Synephrine), can be used to temporarily treat congestion. However, these sprays should not be used for more than two to three days due to the risk of rebound congestion (when the nose becomes congested constantly unless the medication is used repeatedly), possible addiction, and long-term consequences of frequent use, including persistent nasal dryness and crusting, which is very difficult to treat once it has developed.    Oral antihistamines   Oral antihistamines (such as diphenhydramine /Benadryl) are not proven to improve symptoms of sinusitis and can have unwanted side effects.    Mucolytics   Medications to thin secretions (such as guaifenesin) may help to clear mucus.    Steam inhalation   Breathing in warm, moist air (steam) may temporarily relieve congestion, but there is no evidence that it will shorten the duration or severity of symptoms. If you choose to try this, be sure that the water you use to make steam is clean  and free of mold or other contaminants.    Seek medical attention immediately if you develop new or worsening symptoms, including but not limited to:  New or worse swelling, redness, or pain in your face or around one or both of your eyes.  Double vision or a change in your vision.  High fever  Severe headache and a stiff neck.  Mental changes, such as feeling confused or much less alert.    If your condition fails to improve in a timely manner, you should receive another evaluation by your Primary Care Provider to discuss your concerns or return to urgent care for a recheck.      **You must understand that you have received Urgent Care treatment only and that you may be released before all your medical problems are known or treated. You, the patient, are responsible to arrange for follow-up care as instructed.